# Patient Record
Sex: FEMALE
[De-identification: names, ages, dates, MRNs, and addresses within clinical notes are randomized per-mention and may not be internally consistent; named-entity substitution may affect disease eponyms.]

---

## 2024-05-09 PROBLEM — Z00.00 ENCOUNTER FOR PREVENTIVE HEALTH EXAMINATION: Status: ACTIVE | Noted: 2024-05-09

## 2024-05-14 ENCOUNTER — APPOINTMENT (OUTPATIENT)
Dept: UROLOGY | Facility: CLINIC | Age: 64
End: 2024-05-14
Payer: SELF-PAY

## 2024-05-14 VITALS
TEMPERATURE: 97.7 F | BODY MASS INDEX: 17.36 KG/M2 | HEIGHT: 66 IN | SYSTOLIC BLOOD PRESSURE: 117 MMHG | HEART RATE: 70 BPM | OXYGEN SATURATION: 98 % | WEIGHT: 108 LBS | DIASTOLIC BLOOD PRESSURE: 79 MMHG

## 2024-05-14 DIAGNOSIS — Z78.9 OTHER SPECIFIED HEALTH STATUS: ICD-10-CM

## 2024-05-14 DIAGNOSIS — R31.29 OTHER MICROSCOPIC HEMATURIA: ICD-10-CM

## 2024-05-14 DIAGNOSIS — Z80.42 FAMILY HISTORY OF MALIGNANT NEOPLASM OF PROSTATE: ICD-10-CM

## 2024-05-14 DIAGNOSIS — Z87.891 PERSONAL HISTORY OF NICOTINE DEPENDENCE: ICD-10-CM

## 2024-05-14 LAB
BILIRUB UR QL STRIP: NORMAL
CLARITY UR: CLEAR
COLLECTION METHOD: NORMAL
GLUCOSE UR-MCNC: NORMAL
HCG UR QL: 0.2 EU/DL
HGB UR QL STRIP.AUTO: NORMAL
KETONES UR-MCNC: NORMAL
LEUKOCYTE ESTERASE UR QL STRIP: NORMAL
NITRITE UR QL STRIP: NORMAL
PH UR STRIP: 5.5
PROT UR STRIP-MCNC: NORMAL
SP GR UR STRIP: 1.02

## 2024-05-14 PROCEDURE — 81003 URINALYSIS AUTO W/O SCOPE: CPT | Mod: QW

## 2024-05-14 PROCEDURE — 99203 OFFICE O/P NEW LOW 30 MIN: CPT

## 2024-05-15 LAB
ANION GAP SERPL CALC-SCNC: 12 MMOL/L
APPEARANCE: CLEAR
BACTERIA: NEGATIVE /HPF
BILIRUBIN URINE: NEGATIVE
BLOOD URINE: ABNORMAL
BUN SERPL-MCNC: 19 MG/DL
CALCIUM SERPL-MCNC: 9.3 MG/DL
CAST: 0 /LPF
CHLORIDE SERPL-SCNC: 104 MMOL/L
CO2 SERPL-SCNC: 25 MMOL/L
COLOR: YELLOW
CREAT SERPL-MCNC: 0.74 MG/DL
EGFR: 92 ML/MIN/1.73M2
EPITHELIAL CELLS: 0 /HPF
GLUCOSE QUALITATIVE U: NEGATIVE MG/DL
GLUCOSE SERPL-MCNC: 94 MG/DL
KETONES URINE: NEGATIVE MG/DL
LEUKOCYTE ESTERASE URINE: NEGATIVE
MICROSCOPIC-UA: NORMAL
NITRITE URINE: NEGATIVE
PH URINE: 5.5
POTASSIUM SERPL-SCNC: 4.7 MMOL/L
PROTEIN URINE: NEGATIVE MG/DL
RED BLOOD CELLS URINE: 10 /HPF
SODIUM SERPL-SCNC: 141 MMOL/L
SPECIFIC GRAVITY URINE: 1.02
UROBILINOGEN URINE: 0.2 MG/DL
WHITE BLOOD CELLS URINE: 0 /HPF

## 2024-05-16 LAB — BACTERIA UR CULT: NORMAL

## 2024-05-20 LAB — URINE CYTOLOGY: NORMAL

## 2024-05-20 NOTE — HISTORY OF PRESENT ILLNESS
[FreeTextEntry1] : Language: *** Date of First visit: 2024 Accompanied by: *** Contact info: *** Referring Provider/PCP: Referred by   Dr Odalys Lau ([place]),  [phone]   PCP Dr Brittney Frankel  [phone]    CC/ Problem List:   =============================================================================== FIRST VISIT: The patient is a 63F who first presents on 2024 for gross and microscopic hematuria. We do not have any records. She is unsure how many RBC/hpf were detected however she states this has been going on "for years". She reports a small blood clot in her urine when she voided 2 weeks ago. The day before this happened, she was experiencing unusual left sided back pain. She is still experiencing her normal amount of back pain now and has started PT.  She has a 35 year hx of smoking as well as 2nd hand exposure x 8 years when she was growing up. She denies chemical/occupation exposures. 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 Her father had bladder cancer. She is unclear on the details surrounding diagnosis/treatment but thinks he had surgery. She currently denies dysuria, gross hematuria, flank pain, fever, frequency, urgency.  She does have a hx of nephrolithiasis x 1 episode around . This passed without intervention.    ------------------------------------------------------------------------------------------- INTERVAL VISITS:     ===============================================================================   PMH: Nephrolithiasis (passed without intervention 10 years ago) PSH: None POBH: (if applicable): Postmenopausal, ; LMP age 45 FH: Father bladder cancer (had surgery)   ALL: Lactose, cipro MEDS: Angeliq (x18 years) SOC: Former smoker x 35 years, 3-5 cigarettes daily     ROS: Review of Systems is as per HPI unless otherwise denoted below     =============================================================================== DATA:   LABS:------------------------------------------------------------------------------------------------------------------- 3/26/2024: UA micro  3-10 RBC/hpf, neg NIT, neg leuks, 2+ blood 3/26/2024: Urine culture no growth 2024: Urine dip - large blood, neg NIT, neg KAROLINA     RADS:------------------------------------------------------------------------------------------------------------------- 2024: HENRI 3cc     PATHOLOGY/CYTOLOGY:-------------------------------------------------------------------------------------------       VOIDING STUDIES: ----------------------------------------------------------------------------------------------------  2024: PVR 3cc     STONE STUDIES: (Analysis/LLSA)----------------------------------------------------------------------------------       PROCEDURES: -----------------------------------------------------------------------------------------------         ===============================================================================    PHYSICAL EXAM:  GEN: AAOx3, NAD; Habitus: normal  BARRIERS to CARE: None  PSYCH: Appropriate Behavior, Affect Congruent  HEENT: AT/NC Trachea midline. EOMI.  Lungs: No labored breathing.  NEURO: + Movement, all 4 extremities grossly intact without deficits. No tremors.  SKIN: Warm dry. No visible rashes or ulcers  GAIT: Gait normal , Stability good  ABD: Soft, NT ND. No rebound, No guarding. No masses.  No suprapubic tenderness,  MSK: No CVAT BL  =======================================================================================      ASSESSMENT and PLAN   The patient is a 63 year female with a history of the followin. Microscopic hematuria and gross hematuria with hx of nephrolithiasis  Because of her age, smoking history and gross hematuria, she falls into the high risk category.  **************************************2020 MICROHEMATURIA GUIDELINES**************************************************  Hematuria RISK FACTORS----------------------------------------------------------------------------------------------------- Age, Gender, Smoking, Chronic foreign body/catheter, occupational exposure,  Prior Cyclophosphamide or ifosfomide, prior RT, irritative voiding symptoms  RISK STRATIFICATION---------------------------------------------------------------------------------------------------------  -.-.-.-.-.-.-.-.-.-.-.Low Risk-.-.-.-.-.-.-.-.-.-.-.-.-.Mod Risk-.-.-.-.-.-.-.-.-.-.-.-.-.-.High Risk-.-.-.-.-.-.-.-.-.-.-.-.-.-.-.-.-.-.- (ALL OF THE BELOW)                    (ANY ONE OF THE BELOW)         (ANY OF THE BELOW or MORE) --Females <49yo                                   --Female 50-58yo                      --Females >61yo -- Males <39yo                                      --Males 40-58yo                        --Males >61yo 0-10 pack years                                   10-30 pack years                       >30 pack years 3-10 RBC/hpf                                         11-25 RBC/hpf                          >25 RBC/hpf No RISK FACTORS                               ANY RISK FACTORS                                                                                Low risk w/ 3-10 RBC                 Gross hematuria                                                                     on repeat UA   WORKUP------------------------------------------------------------------------------------------------------------------------- -.-.-.-.-.-.-.-.-.-.-.Low Risk-.-.-.-.-.-.-.-.-.-.-.-.-.Mod Risk-.-.-.-.-.-.-.-.-.-.-.-.-.-.High Risk-.-.-.-.-.-.-.-.-.-.-.-.-.-.-.-.-.-.- --Initial: Repeat UA 6 mos                           Cystoscopy                              Cystoscopy             OR                                                        AND                                         AND    Cysto, Renal sono                                   Renal Sono                                    CTU OR                                                                                                               2nd Choice: MRU                                                                                                               3rd Choice: RTGP w/ NCCT or Sono  **In patients with microhematuria with a known FH of RCC or genetic renal tumor syndrome,  upper tract imaging should be performed regardless of risk stratification   2. h/o Kidney stones We will see if she has any stones on CTU    -----------------------------------------------------------------------------------------------------   LABS/TESTS Ordered: BMP, UA, UCx, cytology, CTU, cystoscopy   Meds Ordered:   Follow up: Follow up after CTU for cystoscopy   -----------------------------------------------------------------------------------------------------    The total amount of time I have personally spent preparing for this visit, reviewing the patient's test results, obtaining external history, ordering tests/medications, documenting clinical information, communicating with and counseling the patient/family and/or caregiver(s), and spent face to face with the patient explaining the above was  40 minutes.    Thank you for allowing me to participate in your patient's care. Please feel free to contact me with any questions.   Rosa Coelho MD [facility] Department of Urology   [address] [place] [id number] P: [phone]; F: [phone]

## 2024-05-20 NOTE — ADDENDUM
[FreeTextEntry1] : 5/14/2024: UA micro with moderate blood, 10 RBC/hpf, neg NIT, neg bacteria Cr 0.74 UCx no growth Urine cytology negative  3/26/2024 UA 1+ ketones, 2+ blood, Neg NIT/LE, 3-10 RBC/hpf, 0-5 Epi, Few Bacteria, UCx no growth   I, Dr. Coelho, personally performed the evaluation and management (E/M) services for this new patient.  That E/M includes conducting the initial examination, assessing all conditions, and establishing the plan of care.  Today, my ACP, Kaya Carrington, was here to observe my evaluation and management services for this patient to be followed going forward.

## 2024-06-05 ENCOUNTER — APPOINTMENT (OUTPATIENT)
Dept: CT IMAGING | Facility: HOSPITAL | Age: 64
End: 2024-06-05

## 2024-06-24 ENCOUNTER — APPOINTMENT (OUTPATIENT)
Dept: UROLOGY | Facility: CLINIC | Age: 64
End: 2024-06-24
Payer: SELF-PAY

## 2024-06-24 VITALS
SYSTOLIC BLOOD PRESSURE: 102 MMHG | TEMPERATURE: 98.1 F | HEART RATE: 79 BPM | DIASTOLIC BLOOD PRESSURE: 71 MMHG | OXYGEN SATURATION: 100 %

## 2024-06-24 LAB
BILIRUB UR QL STRIP: NORMAL
CLARITY UR: CLEAR
COLLECTION METHOD: NORMAL
GLUCOSE UR-MCNC: NORMAL
HCG UR QL: 0.2 EU/DL
HGB UR QL STRIP.AUTO: NORMAL
KETONES UR-MCNC: NORMAL
LEUKOCYTE ESTERASE UR QL STRIP: NORMAL
NITRITE UR QL STRIP: NORMAL
PH UR STRIP: 5.5
PROT UR STRIP-MCNC: NORMAL
SP GR UR STRIP: 1.01

## 2024-06-24 PROCEDURE — 99214 OFFICE O/P EST MOD 30 MIN: CPT | Mod: 25

## 2024-06-24 PROCEDURE — 81003 URINALYSIS AUTO W/O SCOPE: CPT | Mod: QW

## 2024-06-24 PROCEDURE — 52000 CYSTOURETHROSCOPY: CPT
